# Patient Record
Sex: FEMALE | Race: WHITE | NOT HISPANIC OR LATINO | Employment: STUDENT | URBAN - METROPOLITAN AREA
[De-identification: names, ages, dates, MRNs, and addresses within clinical notes are randomized per-mention and may not be internally consistent; named-entity substitution may affect disease eponyms.]

---

## 2024-05-03 RX ORDER — ALBUTEROL SULFATE 2.5 MG/3ML
2.5 SOLUTION RESPIRATORY (INHALATION) EVERY 4 HOURS PRN
Qty: 9 ML | Refills: 0 | Status: SHIPPED | OUTPATIENT
Start: 2024-05-03

## 2024-05-03 NOTE — TELEPHONE ENCOUNTER
Caller: JD Ana    Relationship: Self    Best call back number: [phone]    Requested Prescriptions:   Requested Prescriptions     Pending Prescriptions Disp Refills    albuterol (PROVENTIL) (2.5 MG/3ML) 0.083% nebulizer solution       Sig: Take 2.5 mg by nebulization Every 4 (Four) Hours As Needed.        Pharmacy where request should be sent: [facility] #[id number] - [place], [place] - [address] - [phone] [facility] [phone] FX     Last office visit with prescribing clinician: 1/15/2024   Last telemedicine visit with prescribing clinician: Visit date not found   Next office visit with prescribing clinician: 6/12/2024     Additional details provided by patient: ASTHMA IS ACTING UP, WHEEZING HAS STARTED    Does the patient have less than a 3 day supply:  [x] Yes  [] No    Would you like a call back once the refill request has been completed: [] Yes [x] No    If the office needs to give you a call back, can they leave a voicemail: [] Yes [x] No    Venu Ramirez   05/03/24 14:09 EDT

## 2024-05-03 NOTE — TELEPHONE ENCOUNTER
Caller: Ana MILES    Relationship: Self    Best call back number: [phone]     Requested Prescriptions:   albuterol (PROVENTIL) (2.5 MG/3ML) 0.083% nebulizer solution       Pharmacy where request should be sent: [facility] #[id number] - [place], [place] - [address] - [phone] PH - [phone] FX     Last office visit with prescribing clinician: 1/15/2024   Last telemedicine visit with prescribing clinician: Visit date not found   Next office visit with prescribing clinician: 2024     Additional details provided by patient: MEDICATION IS  SO SHE NEEDS A NEW PRESCRIPTION    Does the patient have less than a 3 day supply:  [x] Yes  [] No    Would you like a call back once the refill request has been completed: [x] Yes [] No    If the office needs to give you a call back, can they leave a voicemail: [] Yes [] No    Hans Abbott Rep   24 10:08 EDT

## 2024-06-06 ENCOUNTER — TELEPHONE (OUTPATIENT)
Dept: FAMILY MEDICINE CLINIC | Facility: CLINIC | Age: 29
End: 2024-06-06
Payer: COMMERCIAL

## 2024-06-21 ENCOUNTER — LAB (OUTPATIENT)
Dept: FAMILY MEDICINE CLINIC | Facility: CLINIC | Age: 29
End: 2024-06-21
Payer: COMMERCIAL

## 2024-06-21 ENCOUNTER — OFFICE VISIT (OUTPATIENT)
Dept: FAMILY MEDICINE CLINIC | Facility: CLINIC | Age: 29
End: 2024-06-21
Payer: COMMERCIAL

## 2024-06-21 VITALS
DIASTOLIC BLOOD PRESSURE: 84 MMHG | BODY MASS INDEX: 53.92 KG/M2 | HEART RATE: 83 BPM | SYSTOLIC BLOOD PRESSURE: 136 MMHG | RESPIRATION RATE: 18 BRPM | OXYGEN SATURATION: 98 % | WEIGHT: 293 LBS | HEIGHT: 62 IN

## 2024-06-21 DIAGNOSIS — E28.2 PCOS (POLYCYSTIC OVARIAN SYNDROME): ICD-10-CM

## 2024-06-21 DIAGNOSIS — Z00.00 ANNUAL PHYSICAL EXAM: Primary | ICD-10-CM

## 2024-06-21 PROCEDURE — 80061 LIPID PANEL: CPT | Performed by: STUDENT IN AN ORGANIZED HEALTH CARE EDUCATION/TRAINING PROGRAM

## 2024-06-21 PROCEDURE — 80050 GENERAL HEALTH PANEL: CPT | Performed by: STUDENT IN AN ORGANIZED HEALTH CARE EDUCATION/TRAINING PROGRAM

## 2024-06-21 PROCEDURE — 36415 COLL VENOUS BLD VENIPUNCTURE: CPT | Performed by: STUDENT IN AN ORGANIZED HEALTH CARE EDUCATION/TRAINING PROGRAM

## 2024-06-21 PROCEDURE — 99395 PREV VISIT EST AGE 18-39: CPT | Performed by: STUDENT IN AN ORGANIZED HEALTH CARE EDUCATION/TRAINING PROGRAM

## 2024-06-21 PROCEDURE — 83036 HEMOGLOBIN GLYCOSYLATED A1C: CPT | Performed by: STUDENT IN AN ORGANIZED HEALTH CARE EDUCATION/TRAINING PROGRAM

## 2024-06-21 RX ORDER — ALBUTEROL SULFATE 2.5 MG/3ML
2.5 SOLUTION RESPIRATORY (INHALATION) EVERY 6 HOURS PRN
COMMUNITY
Start: 2024-01-23 | End: 2025-01-22

## 2024-06-21 RX ORDER — METFORMIN HYDROCHLORIDE 500 MG/1
500 TABLET, EXTENDED RELEASE ORAL
Qty: 30 TABLET | Refills: 3 | Status: SHIPPED | OUTPATIENT
Start: 2024-06-21

## 2024-06-21 NOTE — PROGRESS NOTES
"Chief Complaint  Chief Complaint   Patient presents with    Annual Exam     Subjective        Ana SALAZAR is a 29 y.o. female who presents to [facility].    History of Present Illness  Here for annual physical.    Diet: good intake of fruits and vegetables.  Does drink a lot of soda.    Exercise: swims 1-2x/wk.  Hikes occasionally.    Sleep: no concerns.      Family history: no colon ca, breast ca, cervical ca.  She does have ovarian cancer in her grandmother, diagnosed in late 50s / early 60s.  She had a colonoscopy in 2020.      Pap: sees gyn    She has been diagnosed with PCOS in the past.  Has regular periods w/ birth control pill, but prior did not have regular periods.  Her and  are planning on trying to conceive in September.  She is concerned about her weight, wanting to lose some, and prevent diabetes.      Objective   /84   Pulse 83   Resp 18   Ht 157.5 cm (62.01\")   Wt 133 kg (293 lb)   SpO2 98%   BMI 53.57 kg/m²     Estimated body mass index is 53.57 kg/m² as calculated from the following:    Height as of this encounter: 157.5 cm (62.01\").    Weight as of this encounter: 133 kg (293 lb).     Physical Exam   GEN: In no acute distress, non toxic appearing  HEENT: Pupils equal and reactive to light, sclera clear. Mucous membranes moist. Oropharynx without erythema or exudate. No cervical or submandibular lymphadenopathy.  Bilateral TMs WNL.  CV: Regular rate and rhythm, no murmurs, 2+ peripheral pulses, No extremity edema.   RESP: Lungs clear to auscultation anteriorly and posteriorly in all lung fields bilaterally.  NEURO: AAO to person, place, and time. CN 2-12 intact grossly.   PSYCH: Affect normal, insight fair     PHQ-2 Depression Screening  Little interest or pleasure in doing things? 0-->not at all   Feeling down, depressed, or hopeless? 0-->not at all   PHQ-2 Total Score 0      Result Review :              Assessment and Plan     Diagnoses and all " orders for this visit:    1. Annual physical exam (Primary)  Blood pressure goal today.  Encouraged regular exercise.  Encouraged healthy diet, recommend limiting soda, low-carb diet.  Check blood work as below.  Up-to-date on Pap.  Mammogram to start at 40.  Colon cancer screening to start at 45.  Next physical in 1 year, follow-up 3 months for weight and PCOS.  -     CBC (No Diff)  -     Comprehensive Metabolic Panel  -     Hemoglobin A1c  -     Lipid Panel  -     TSH    2. PCOS (polycystic ovarian syndrome)  Continue OCP.  Start metformin 500 mg daily.  Low-carb diet as above.  Follow-up 3 months.  -     metFORMIN ER (GLUCOPHAGE-XR) 500 MG 24 hr tablet; Take 1 tablet by mouth Daily With Breakfast.  Dispense: 30 tablet; Refill: 3       Follow Up     Return in about 11 weeks (around 9/6/2024) for PCOS f/u - 30 minutes please .

## 2024-06-22 LAB
ALBUMIN SERPL-MCNC: 4.1 G/DL (ref 3.5–5.2)
ALBUMIN/GLOB SERPL: 1.3 G/DL
ALP SERPL-CCNC: 46 U/L (ref 39–117)
ALT SERPL W P-5'-P-CCNC: 25 U/L (ref 1–33)
ANION GAP SERPL CALCULATED.3IONS-SCNC: 14.5 MMOL/L (ref 5–15)
AST SERPL-CCNC: 29 U/L (ref 1–32)
BILIRUB SERPL-MCNC: 0.4 MG/DL (ref 0–1.2)
BUN SERPL-MCNC: 8 MG/DL (ref 6–20)
BUN/CREAT SERPL: 14 (ref 7–25)
CALCIUM SPEC-SCNC: 9.7 MG/DL (ref 8.6–10.5)
CHLORIDE SERPL-SCNC: 101 MMOL/L (ref 98–107)
CHOLEST SERPL-MCNC: 178 MG/DL (ref 0–200)
CO2 SERPL-SCNC: 21.5 MMOL/L (ref 22–29)
CREAT SERPL-MCNC: 0.57 MG/DL (ref 0.57–1)
DEPRECATED RDW RBC AUTO: 43.3 FL (ref 37–54)
EGFRCR SERPLBLD CKD-EPI 2021: 126.3 ML/MIN/1.73
ERYTHROCYTE [DISTWIDTH] IN BLOOD BY AUTOMATED COUNT: 13.3 % (ref 12.3–15.4)
GLOBULIN UR ELPH-MCNC: 3.1 GM/DL
GLUCOSE SERPL-MCNC: 75 MG/DL (ref 65–99)
HBA1C MFR BLD: 5.4 % (ref 4.8–5.6)
HCT VFR BLD AUTO: 41.4 % (ref 34–46.6)
HDLC SERPL-MCNC: 57 MG/DL (ref 40–60)
HGB BLD-MCNC: 13.6 G/DL (ref 12–15.9)
LDLC SERPL CALC-MCNC: 101 MG/DL (ref 0–100)
LDLC/HDLC SERPL: 1.72 {RATIO}
MCH RBC QN AUTO: 28.8 PG (ref 26.6–33)
MCHC RBC AUTO-ENTMCNC: 32.9 G/DL (ref 31.5–35.7)
MCV RBC AUTO: 87.7 FL (ref 79–97)
PLATELET # BLD AUTO: 315 10*3/MM3 (ref 140–450)
PMV BLD AUTO: 9.8 FL (ref 6–12)
POTASSIUM SERPL-SCNC: 3.5 MMOL/L (ref 3.5–5.2)
PROT SERPL-MCNC: 7.2 G/DL (ref 6–8.5)
RBC # BLD AUTO: 4.72 10*6/MM3 (ref 3.77–5.28)
SODIUM SERPL-SCNC: 137 MMOL/L (ref 136–145)
TRIGL SERPL-MCNC: 115 MG/DL (ref 0–150)
TSH SERPL DL<=0.05 MIU/L-ACNC: 1.87 UIU/ML (ref 0.27–4.2)
VLDLC SERPL-MCNC: 20 MG/DL (ref 5–40)
WBC NRBC COR # BLD AUTO: 9.68 10*3/MM3 (ref 3.4–10.8)

## 2024-09-24 ENCOUNTER — OFFICE VISIT (OUTPATIENT)
Dept: FAMILY MEDICINE CLINIC | Facility: CLINIC | Age: 29
End: 2024-09-24
Payer: COMMERCIAL

## 2024-09-24 VITALS
HEART RATE: 93 BPM | RESPIRATION RATE: 18 BRPM | DIASTOLIC BLOOD PRESSURE: 83 MMHG | BODY MASS INDEX: 53.44 KG/M2 | HEIGHT: 62 IN | WEIGHT: 290.4 LBS | OXYGEN SATURATION: 96 % | SYSTOLIC BLOOD PRESSURE: 129 MMHG

## 2024-09-24 DIAGNOSIS — E66.01 OBESITY, MORBID, BMI 50 OR HIGHER: ICD-10-CM

## 2024-09-24 DIAGNOSIS — E28.2 PCOS (POLYCYSTIC OVARIAN SYNDROME): Primary | ICD-10-CM

## 2024-09-24 DIAGNOSIS — J45.20 MILD INTERMITTENT ASTHMA WITHOUT COMPLICATION: ICD-10-CM

## 2024-09-24 PROCEDURE — 99213 OFFICE O/P EST LOW 20 MIN: CPT | Performed by: STUDENT IN AN ORGANIZED HEALTH CARE EDUCATION/TRAINING PROGRAM

## 2024-09-24 RX ORDER — ALBUTEROL SULFATE 90 UG/1
2 INHALANT RESPIRATORY (INHALATION) EVERY 6 HOURS PRN
Qty: 8 G | Refills: 2 | Status: SHIPPED | OUTPATIENT
Start: 2024-09-24

## 2025-01-03 ENCOUNTER — TELEPHONE (OUTPATIENT)
Dept: FAMILY MEDICINE CLINIC | Facility: CLINIC | Age: 30
End: 2025-01-03

## 2025-01-03 ENCOUNTER — OFFICE VISIT (OUTPATIENT)
Dept: FAMILY MEDICINE CLINIC | Facility: CLINIC | Age: 30
End: 2025-01-03
Payer: COMMERCIAL

## 2025-01-03 VITALS
BODY MASS INDEX: 53.88 KG/M2 | SYSTOLIC BLOOD PRESSURE: 129 MMHG | DIASTOLIC BLOOD PRESSURE: 86 MMHG | OXYGEN SATURATION: 96 % | HEART RATE: 111 BPM | HEIGHT: 62 IN | WEIGHT: 292.8 LBS | RESPIRATION RATE: 18 BRPM

## 2025-01-03 DIAGNOSIS — E28.2 PCOS (POLYCYSTIC OVARIAN SYNDROME): Primary | ICD-10-CM

## 2025-01-03 DIAGNOSIS — E66.01 OBESITY, MORBID, BMI 50 OR HIGHER: ICD-10-CM

## 2025-01-03 PROCEDURE — 99214 OFFICE O/P EST MOD 30 MIN: CPT | Performed by: STUDENT IN AN ORGANIZED HEALTH CARE EDUCATION/TRAINING PROGRAM

## 2025-01-03 RX ORDER — METFORMIN HYDROCHLORIDE 500 MG/1
500 TABLET, EXTENDED RELEASE ORAL
Qty: 30 TABLET | Refills: 3 | Status: SHIPPED | OUTPATIENT
Start: 2025-01-03

## 2025-01-03 RX ORDER — PHENTERMINE HYDROCHLORIDE 37.5 MG/1
37.5 TABLET ORAL
Qty: 30 TABLET | Refills: 2 | Status: SHIPPED | OUTPATIENT
Start: 2025-01-03

## 2025-01-03 NOTE — TELEPHONE ENCOUNTER
Caller: Ana Lombardi    Relationship to patient: Self      Best call back number: [phone]    Provider: ERIK RIZZO    Medication PA needed:     phentermine (ADIPEX-P) 37.5 MG tablet       Reason for call/Prior Auth: INSURANCE COVERAGE PURPOSES

## 2025-01-03 NOTE — PROGRESS NOTES
"Chief Complaint  Chief Complaint   Patient presents with    Follow-up     Subjective        Ana Lombardi is a 29 y.o. female who presents to [facility].    History of Present Illness  Here for f/u of PCOS / weight.  Started metformin 500 mg daily and stopped her OCP about 4 months ago.  She has not had a period in that time frame and does not feel well.    She is having period cramping but without the period.  She has cut out sodas but has not seen any weight loss.    She is discouraged.  She has appt w/ gyn next week.      Objective   /86   Pulse 111   Resp 18   Ht 157.5 cm (62.01\")   Wt 133 kg (292 lb 12.8 oz)   SpO2 96%   BMI 53.54 kg/m²     Estimated body mass index is 53.54 kg/m² as calculated from the following:    Height as of this encounter: 157.5 cm (62.01\").    Weight as of this encounter: 133 kg (292 lb 12.8 oz).     Physical Exam   GEN: In no acute distress, non toxic appearing  HEENT: Pupils equal and reactive to light, sclera clear. Mucous membranes moist.   NEURO: AAO to person, place, and time. CN 2-12 intact grossly.   PSYCH: Affect normal, insight fair     PHQ-2 Depression Screening  Little interest or pleasure in doing things? Not at all   Feeling down, depressed, or hopeless? Not at all   PHQ-2 Total Score 0      Result Review :              Assessment and Plan     Diagnoses and all orders for this visit:    1. PCOS (polycystic ovarian syndrome) (Primary)  -     metFORMIN ER (GLUCOPHAGE-XR) 500 MG 24 hr tablet; Take 1 tablet by mouth Daily With Breakfast.  Dispense: 30 tablet; Refill: 3    2. Obesity, morbid, BMI 50 or higher  -     phentermine (ADIPEX-P) 37.5 MG tablet; Take 1 tablet by mouth Every Morning Before Breakfast.  Dispense: 30 tablet; Refill: 2    Continue metformin 500 mg daily.  Keep f/u with gyn next week.  Start phentermine 37.5 mg daily.  Discussed possible side effects to monitor for.  Discussed low carb diet, regular exercise.      "     Follow Up     Return in about 3 months (around 4/3/2025) for PCOS / weight f/u - 30 mins please .

## 2025-01-17 ENCOUNTER — TELEPHONE (OUTPATIENT)
Dept: FAMILY MEDICINE CLINIC | Facility: CLINIC | Age: 30
End: 2025-01-17

## 2025-01-17 NOTE — TELEPHONE ENCOUNTER
Caller: Ana Lombardi    Relationship: Self    Best call back number: [phone]    What is the best time to reach you: ANYTIME    Who are you requesting to speak with (clinical staff, provider,  specific staff member): PROVIDER       What was the call regarding: PATIENT WOULD LIKE FOR DR. RIZZO TO CALL HER TODAY.  LAB RESULTS CAME BACK AND WAS AST 40 U/L  ALT 59 U/L.    PATIENT STATED SHE BELIEVES SOMETHING IS GOING ON WITH HER LIVER.    PLEASE CALL TO ADVISE.

## 2025-01-21 ENCOUNTER — OFFICE VISIT (OUTPATIENT)
Dept: FAMILY MEDICINE CLINIC | Facility: CLINIC | Age: 30
End: 2025-01-21
Payer: COMMERCIAL

## 2025-01-21 VITALS
RESPIRATION RATE: 16 BRPM | DIASTOLIC BLOOD PRESSURE: 72 MMHG | HEART RATE: 71 BPM | BODY MASS INDEX: 51.97 KG/M2 | SYSTOLIC BLOOD PRESSURE: 122 MMHG | WEIGHT: 282.38 LBS | HEIGHT: 62 IN | OXYGEN SATURATION: 98 %

## 2025-01-21 DIAGNOSIS — R10.12 LEFT UPPER QUADRANT ABDOMINAL PAIN: ICD-10-CM

## 2025-01-21 DIAGNOSIS — R74.8 ELEVATED LIVER ENZYMES: Primary | ICD-10-CM

## 2025-01-21 PROCEDURE — 99214 OFFICE O/P EST MOD 30 MIN: CPT | Performed by: STUDENT IN AN ORGANIZED HEALTH CARE EDUCATION/TRAINING PROGRAM

## 2025-01-21 RX ORDER — MEDROXYPROGESTERONE ACETATE 10 MG
1 TABLET ORAL DAILY
COMMUNITY
Start: 2025-01-10

## 2025-01-21 RX ORDER — OMEPRAZOLE 40 MG/1
40 CAPSULE, DELAYED RELEASE ORAL DAILY
Qty: 30 CAPSULE | Refills: 1 | Status: SHIPPED | OUTPATIENT
Start: 2025-01-21

## 2025-01-21 NOTE — PROGRESS NOTES
"Chief Complaint  Chief Complaint   Patient presents with    Abdominal Pain     LUQ - started hurting couple months ago.      Subjective        Ana Lombardi is a 30 y.o. female who presents to [facility].    History of Present Illness  Here for acute visit.   Abd pain - LUQ for a couple months.  Over the last 2 wks it has increased in frequency.    It is a dull ache that is intermittently present.    At night when she lays on her stomach she notices it more.   No difference with food.    No heartburn symptoms.    She is having regular BM's.  No blood.  BM's don't make a difference in pain.      Blood work at gyn office about 10 days ago showed elevated liver enzymes.    AST - 40; ALT - 59.    She is eating a lower carb diet.  She is walking on treadmill 30 minutes every morning.    She decided to hold off on phentermine for now.  She is drinking only water.      Objective   /72   Pulse 71   Resp 16   Ht 157.5 cm (62\")   Wt 128 kg (282 lb 6 oz)   SpO2 98%   BMI 51.65 kg/m²     Estimated body mass index is 51.65 kg/m² as calculated from the following:    Height as of this encounter: 157.5 cm (62\").    Weight as of this encounter: 128 kg (282 lb 6 oz).     Physical Exam   GEN: In no acute distress, non toxic appearing  ABD: Soft, nontender, nondistended     Result Review :              Assessment and Plan     Diagnoses and all orders for this visit:    1. Elevated liver enzymes (Primary)  Likely related to fatty liver.  She has made some great diet and exercise changes and is already seeing results.  Her liver enzymes should improve with continued diet and exercise changes as well as weight loss.  Continue to minimize alcohol, tylenol use.    Recheck in 3-4 wks to ensure they are not worsening.   -     Hepatic Function Panel; Future    2. Left upper quadrant abdominal pain  Differential includes gastritis, ulcer, gas pains in splenic flexure, constipation, diverticulosis.  We " will trial omeprazole 40 mg daily x 2-4 wks.  If improves then likely gastritis.  If no improvement or any worsening at any point will obtain CT abd / pelvis.  Watch for darkening of stools that could indicate upper GI bleed.    Update us if any changes.    -     omeprazole (priLOSEC) 40 MG capsule; Take 1 capsule by mouth Daily.  Dispense: 30 capsule; Refill: 1       Follow Up     Return in about 3 weeks (around 2/11/2025) for lab appointment for liver function.

## 2025-02-11 ENCOUNTER — LAB (OUTPATIENT)
Dept: FAMILY MEDICINE CLINIC | Facility: CLINIC | Age: 30
End: 2025-02-11
Payer: COMMERCIAL

## 2025-02-11 DIAGNOSIS — R74.8 ELEVATED LIVER ENZYMES: ICD-10-CM

## 2025-02-11 PROCEDURE — 36415 COLL VENOUS BLD VENIPUNCTURE: CPT

## 2025-02-11 PROCEDURE — 80076 HEPATIC FUNCTION PANEL: CPT | Performed by: STUDENT IN AN ORGANIZED HEALTH CARE EDUCATION/TRAINING PROGRAM

## 2025-02-12 LAB
ALBUMIN SERPL-MCNC: 4.4 G/DL (ref 3.5–5.2)
ALP SERPL-CCNC: 55 U/L (ref 39–117)
ALT SERPL W P-5'-P-CCNC: 50 U/L (ref 1–33)
AST SERPL-CCNC: 37 U/L (ref 1–32)
BILIRUB CONJ SERPL-MCNC: 0.2 MG/DL (ref 0–0.3)
BILIRUB INDIRECT SERPL-MCNC: 0.2 MG/DL
BILIRUB SERPL-MCNC: 0.4 MG/DL (ref 0–1.2)
PROT SERPL-MCNC: 7 G/DL (ref 6–8.5)

## 2025-04-04 ENCOUNTER — LAB (OUTPATIENT)
Dept: FAMILY MEDICINE CLINIC | Facility: CLINIC | Age: 30
End: 2025-04-04
Payer: COMMERCIAL

## 2025-04-04 ENCOUNTER — OFFICE VISIT (OUTPATIENT)
Dept: FAMILY MEDICINE CLINIC | Facility: CLINIC | Age: 30
End: 2025-04-04
Payer: COMMERCIAL

## 2025-04-04 VITALS
DIASTOLIC BLOOD PRESSURE: 70 MMHG | HEART RATE: 70 BPM | OXYGEN SATURATION: 95 % | BODY MASS INDEX: 46.19 KG/M2 | SYSTOLIC BLOOD PRESSURE: 126 MMHG | WEIGHT: 251 LBS | HEIGHT: 62 IN | RESPIRATION RATE: 18 BRPM

## 2025-04-04 DIAGNOSIS — E66.01 OBESITY, MORBID, BMI 50 OR HIGHER: Primary | ICD-10-CM

## 2025-04-04 DIAGNOSIS — E28.2 PCOS (POLYCYSTIC OVARIAN SYNDROME): ICD-10-CM

## 2025-04-04 DIAGNOSIS — Z86.0100 HISTORY OF COLON POLYPS: ICD-10-CM

## 2025-04-04 DIAGNOSIS — R10.12 LEFT UPPER QUADRANT ABDOMINAL PAIN: ICD-10-CM

## 2025-04-04 DIAGNOSIS — R74.8 ELEVATED LIVER ENZYMES: ICD-10-CM

## 2025-04-04 DIAGNOSIS — Z12.11 COLON CANCER SCREENING: ICD-10-CM

## 2025-04-04 PROCEDURE — 36415 COLL VENOUS BLD VENIPUNCTURE: CPT | Performed by: STUDENT IN AN ORGANIZED HEALTH CARE EDUCATION/TRAINING PROGRAM

## 2025-04-04 PROCEDURE — 80076 HEPATIC FUNCTION PANEL: CPT | Performed by: STUDENT IN AN ORGANIZED HEALTH CARE EDUCATION/TRAINING PROGRAM

## 2025-04-04 NOTE — PROGRESS NOTES
"Chief Complaint  Chief Complaint   Patient presents with    Polycystic Ovary Syndrome     Subjective        Ana Lombardi is a 30 y.o. female who presents to [facility].    History of Present Illness  Here to f/u on weight, PCOS, elevated liver enzymes and LUQ abd pain.  In January started omeprazole 40 mg daily for LUQ pain.  Liver enzymes were slowly downtrending.  She was prescribed phentermine in early January.  She is down 41 lbs since January 3 appt.  She is walking / running on a treadmill every day.  She gave up a lot of carbs.  She is eating mostly veggies and meat.    She is eating 1 cheat meal / wk.    She is still having some intermittent LUQ abd pain that is same compared to last appt.    She has had regular period two months in a row.    Drinking mostly lemon water.  No tylenol / ibuprofen / alcohol since January.  Has a sprite twice a week.    She had a colonoscopy in June 2020.  She had 1 polyp removed at that time.  She was told to have follow-up colonoscopy in 5 years.    Objective   /70   Pulse 70   Resp 18   Ht 157.5 cm (62.01\")   Wt 114 kg (251 lb)   SpO2 95%   BMI 45.90 kg/m²     Estimated body mass index is 45.9 kg/m² as calculated from the following:    Height as of this encounter: 157.5 cm (62.01\").    Weight as of this encounter: 114 kg (251 lb).     Physical Exam   GEN: In no acute distress, non toxic appearing  HEENT: Pupils equal and reactive to light, sclera clear. Mucous membranes moist.  NEURO: AAO to person, place, and time. CN 2-12 intact grossly.   PSYCH: Affect normal, insight fair      Result Review :              Assessment and Plan     Diagnoses and all orders for this visit:    1. Obesity, morbid, BMI 50 or higher (Primary)  2. PCOS (polycystic ovarian syndrome)  3. Elevated liver enzymes  4. Left upper quadrant abdominal pain  Continue feeling body with primarily vegetables and protein.  Drink plenty of water.  Continue regular " exercise.  Recheck liver enzymes today.  Question pain at splenic flexure for left upper quadrant pain?  Omeprazole provided no relief.  If increases in intensity or frequency we will order an abdominal CT scan.  Follow-up 3 months.  -     Hepatic Function Panel    5. History of colon polyps  6. Colon cancer screening  1 polyp found on colonoscopy in June 2020 it was recommended she repeat in 5 years.  We will send referral for repeat colonoscopy.  -     Ambulatory Referral For Screening Colonoscopy       Follow Up     Return in about 3 months (around 7/4/2025) for weight f/u - 30 minutes please .

## 2025-04-05 LAB
ALBUMIN SERPL-MCNC: 4.6 G/DL (ref 3.5–5.2)
ALP SERPL-CCNC: 62 U/L (ref 39–117)
ALT SERPL W P-5'-P-CCNC: 43 U/L (ref 1–33)
AST SERPL-CCNC: 33 U/L (ref 1–32)
BILIRUB CONJ SERPL-MCNC: 0.2 MG/DL (ref 0–0.3)
BILIRUB INDIRECT SERPL-MCNC: 0.4 MG/DL
BILIRUB SERPL-MCNC: 0.6 MG/DL (ref 0–1.2)
PROT SERPL-MCNC: 7.3 G/DL (ref 6–8.5)

## 2025-06-05 ENCOUNTER — TELEPHONE (OUTPATIENT)
Dept: FAMILY MEDICINE CLINIC | Facility: CLINIC | Age: 30
End: 2025-06-05

## 2025-06-05 NOTE — TELEPHONE ENCOUNTER
Caller: Ana Lombardi    Relationship: Self    Best call back number: [phone]    What is the best time to reach you:     Who are you requesting to speak with (clinical staff, provider,  specific staff member): CLINICAL STAFF     Do you know the name of the person who called:     What was the call regarding: WOULD LIKE TO KNOW IF TAKING A PRENATAL VITAMIN WILL HURT HER ENZYME NUMBERS, SINCE SHE HAS WORKED SO HARD TO GET THEM UNDER CONTROL. STATED SHE IS HAVING SOME HAIR LOSS AND WOULD LIKE TO BE ABLE TO TAKE SOMETHING TO HELP THAT STOP.    Is it okay if the provider responds through MyChart: NO

## 2025-06-05 NOTE — TELEPHONE ENCOUNTER
Name: Ana Lombardi    Relationship: Self    Best Callback Number: [phone]    HUB PROVIDED THE RELAY MESSAGE FROM THE OFFICE. PATIENT HAS FURTHER QUESTIONS AND WOULD LIKE A CALL BACK.

## 2025-06-06 DIAGNOSIS — E28.2 PCOS (POLYCYSTIC OVARIAN SYNDROME): ICD-10-CM

## 2025-06-09 RX ORDER — METFORMIN HYDROCHLORIDE 500 MG/1
500 TABLET, EXTENDED RELEASE ORAL
Qty: 30 TABLET | Refills: 3 | Status: SHIPPED | OUTPATIENT
Start: 2025-06-09

## 2025-06-12 ENCOUNTER — OFFICE (OUTPATIENT)
Dept: URBAN - METROPOLITAN AREA CLINIC 64 | Facility: CLINIC | Age: 30
End: 2025-06-12
Payer: COMMERCIAL

## 2025-06-12 ENCOUNTER — TRANSCRIBE ORDERS (OUTPATIENT)
Dept: ADMINISTRATIVE | Facility: HOSPITAL | Age: 30
End: 2025-06-12
Payer: COMMERCIAL

## 2025-06-12 VITALS
WEIGHT: 239 LBS | HEIGHT: 62 IN | DIASTOLIC BLOOD PRESSURE: 96 MMHG | HEART RATE: 64 BPM | DIASTOLIC BLOOD PRESSURE: 83 MMHG | SYSTOLIC BLOOD PRESSURE: 136 MMHG | SYSTOLIC BLOOD PRESSURE: 125 MMHG

## 2025-06-12 DIAGNOSIS — K21.9 MILD ACID REFLUX: ICD-10-CM

## 2025-06-12 DIAGNOSIS — K21.9 GASTRO-ESOPHAGEAL REFLUX DISEASE WITHOUT ESOPHAGITIS: ICD-10-CM

## 2025-06-12 DIAGNOSIS — R10.12 LEFT UPPER QUADRANT PAIN: ICD-10-CM

## 2025-06-12 DIAGNOSIS — R10.12 ABDOMINAL PAIN, LEFT UPPER QUADRANT: ICD-10-CM

## 2025-06-12 DIAGNOSIS — Z86.0100 PERSONAL HISTORY OF COLON POLYPS, UNSPECIFIED: Primary | ICD-10-CM

## 2025-06-12 DIAGNOSIS — Z86.0100 PERSONAL HISTORY OF COLON POLYPS, UNSPECIFIED: ICD-10-CM

## 2025-06-12 PROCEDURE — 99203 OFFICE O/P NEW LOW 30 MIN: CPT

## 2025-06-12 RX ORDER — FAMOTIDINE 40 MG/1
40 TABLET, FILM COATED ORAL
Qty: 30 | Refills: 11 | Status: ACTIVE
Start: 2025-06-12

## 2025-06-20 ENCOUNTER — HOSPITAL ENCOUNTER (OUTPATIENT)
Dept: ULTRASOUND IMAGING | Facility: HOSPITAL | Age: 30
Discharge: HOME | End: 2025-06-20
Payer: COMMERCIAL

## 2025-06-20 DIAGNOSIS — Z86.0100 PERSONAL HISTORY OF COLON POLYPS, UNSPECIFIED: ICD-10-CM

## 2025-06-20 DIAGNOSIS — R10.12 ABDOMINAL PAIN, LEFT UPPER QUADRANT: ICD-10-CM

## 2025-06-20 DIAGNOSIS — K21.9 MILD ACID REFLUX: ICD-10-CM

## 2025-06-20 PROCEDURE — 76700 US EXAM ABDOM COMPLETE: CPT

## 2025-07-25 ENCOUNTER — OFFICE VISIT (OUTPATIENT)
Dept: FAMILY MEDICINE CLINIC | Facility: CLINIC | Age: 30
End: 2025-07-25
Payer: COMMERCIAL

## 2025-07-25 ENCOUNTER — LAB (OUTPATIENT)
Dept: FAMILY MEDICINE CLINIC | Facility: CLINIC | Age: 30
End: 2025-07-25
Payer: COMMERCIAL

## 2025-07-25 VITALS
DIASTOLIC BLOOD PRESSURE: 68 MMHG | RESPIRATION RATE: 18 BRPM | HEART RATE: 60 BPM | SYSTOLIC BLOOD PRESSURE: 126 MMHG | HEIGHT: 62 IN | OXYGEN SATURATION: 98 % | WEIGHT: 238 LBS | BODY MASS INDEX: 43.79 KG/M2

## 2025-07-25 DIAGNOSIS — E66.01 OBESITY, MORBID (MORE THAN 100 LBS OVER IDEAL WEIGHT OR BMI > 40): Primary | ICD-10-CM

## 2025-07-25 DIAGNOSIS — E28.2 PCOS (POLYCYSTIC OVARIAN SYNDROME): ICD-10-CM

## 2025-07-25 DIAGNOSIS — J45.20 MILD INTERMITTENT ASTHMA WITHOUT COMPLICATION: ICD-10-CM

## 2025-07-25 DIAGNOSIS — R74.8 ELEVATED LIVER ENZYMES: ICD-10-CM

## 2025-07-25 PROCEDURE — 80076 HEPATIC FUNCTION PANEL: CPT | Performed by: STUDENT IN AN ORGANIZED HEALTH CARE EDUCATION/TRAINING PROGRAM

## 2025-07-25 PROCEDURE — 36415 COLL VENOUS BLD VENIPUNCTURE: CPT | Performed by: STUDENT IN AN ORGANIZED HEALTH CARE EDUCATION/TRAINING PROGRAM

## 2025-07-25 RX ORDER — ALBUTEROL SULFATE 90 UG/1
2 INHALANT RESPIRATORY (INHALATION) EVERY 6 HOURS PRN
Qty: 8 G | Refills: 5 | Status: SHIPPED | OUTPATIENT
Start: 2025-07-25

## 2025-07-25 NOTE — PROGRESS NOTES
Chief Complaint  Chief Complaint   Patient presents with    Follow-up     3 month follow up obesity       Subjective        30 y.o. female who presents to [facility].    History of Present Illness      History of Present Illness  The patient is a 30-year-old female here for a follow-up of obesity. Initial weight was around 292 lbs.  Today's weight is 238 lbs. Weight loss is primarily through a protein and vegetable diet and regular exercise.    She reports feeling well and completed her first 5K run in 59 minutes. Weight has plateaued between 230-235 pounds for the past month. She maintains 75-80% adherence to her diet, attributing slower weight loss to 5K training and PCOS. She has lost four pant sizes and feels healthier. She has an appointment with her gynecologist next month.    She takes metformin daily, aiding in weight loss. With weight loss and metformin, she had regular menstrual cycles from January to May, missed one in June due to 5K stress, but resumed in July. This is the most regular her periods have been. She and her  hope to have a child.    She runs on the treadmill five times a week for 30 minutes, alternating between walking and running. She signed up for another 5K run and aims to complete two more before winter. Her diet consists mainly of protein and vegetables, with occasional pasta or potatoes. She avoids pasta and bread due to sugar content, counts calories (7301-9455/day), and includes zucchini and meat in most meals. She is allergic to milk and lactose intolerant, so she avoids yogurt and protein shakes. She includes eggs and cooks vegetables with olive oil. She does not eat nuts. Main protein sources are chicken, shrimp, and ground beef.    Diagnosed with fatty liver disease, she monitors liver enzyme levels. Recent US showed fatty liver.      Requests a refill for her albuterol inhaler.    Objective   /68   Pulse 60   Resp 18   Ht 157.5 cm  "(62.01\")   Wt 108 kg (238 lb)   SpO2 98%   BMI 43.52 kg/m²     Estimated body mass index is 43.52 kg/m² as calculated from the following:    Height as of this encounter: 157.5 cm (62.01\").    Weight as of this encounter: 108 kg (238 lb).     Physical Exam   GEN: In no acute distress, non toxic appearing  HEENT: Pupils equal and reactive to light, sclera clear. Mucous membranes moist.   ABD: Soft, nontender, nondistended, normal bowel sounds.    Result Review :              Assessment and Plan     Diagnoses and all orders for this visit:    1. Obesity, morbid (more than 100 lbs over ideal weight or BMI > 40) (Primary)  -     Hepatic Function Panel    2. Elevated liver enzymes  -     Hepatic Function Panel    3. PCOS (polycystic ovarian syndrome)  -     Hepatic Function Panel    4. Mild intermittent asthma without complication  -     albuterol sulfate  (90 Base) MCG/ACT inhaler; Inhale 2 puffs Every 6 (Six) Hours As Needed for Wheezing.  Dispense: 8 g; Refill: 5          Assessment & Plan  Obesity / PCOS  - Significant weight loss through diet and exercise; current weight 238 pounds  - Continue healthy diet focusing on protein and vegetables, reduce carbohydrates  - Increase protein intake for metabolism and muscle health  - Maintain regular exercise, including running and walking  - Continue metformin 500 mg daily  - F/u 3 months for recheck    Fatty liver disease  - Secondary to weight  - Continue current diet and exercise regimen  - Check hepatic function panel today    Medication management  - Refill albuterol inhaler      Follow Up     Return in about 3 months (around 10/25/2025) for weight f/u .    Patient or patient representative verbalized consent for the use of Ambient Listening during the visit with  Shorty Cook DO for chart documentation. 7/25/2025  15:00 EDT  "

## 2025-07-26 LAB
ALBUMIN SERPL-MCNC: 4.5 G/DL (ref 3.5–5.2)
ALP SERPL-CCNC: 54 U/L (ref 39–117)
ALT SERPL W P-5'-P-CCNC: 38 U/L (ref 1–33)
AST SERPL-CCNC: 29 U/L (ref 1–32)
BILIRUB CONJ SERPL-MCNC: 0.2 MG/DL (ref 0–0.3)
BILIRUB INDIRECT SERPL-MCNC: 0.4 MG/DL
BILIRUB SERPL-MCNC: 0.6 MG/DL (ref 0–1.2)
PROT SERPL-MCNC: 7.5 G/DL (ref 6–8.5)